# Patient Record
Sex: FEMALE | Race: WHITE | Employment: UNEMPLOYED | URBAN - METROPOLITAN AREA
[De-identification: names, ages, dates, MRNs, and addresses within clinical notes are randomized per-mention and may not be internally consistent; named-entity substitution may affect disease eponyms.]

---

## 2017-01-16 DIAGNOSIS — M54.31 RIGHT SIDED SCIATICA: ICD-10-CM

## 2017-01-17 RX ORDER — METHOCARBAMOL 500 MG/1
500-1000 TABLET, FILM COATED ORAL 4 TIMES DAILY
Qty: 60 TAB | Refills: 0 | Status: SHIPPED | OUTPATIENT
Start: 2017-01-17 | End: 2019-05-02

## 2017-02-17 ENCOUNTER — PATIENT OUTREACH (OUTPATIENT)
Dept: FAMILY MEDICINE CLINIC | Age: 62
End: 2017-02-17

## 2017-02-17 NOTE — PROGRESS NOTES
NN Note    Pt listed on 17 [facility] Discharge Report. As per report information pt was hospitalized @ [facility] 17-2/15/17. Diagnosis: COPD w/Acute Exacerbation. Per EMR \"None\" listed for PCP. Call to pt for PCP update. Pt ID verified with 2 identifiers, name and . NN introduction. Pt reported \"I am no longer your pt. My insurance gave me the name of a doctor up here. Nice lady but she won't prescribe medications I need. Right now I don't have a PCP\". Pt was advised to contact [facility]. Request name for another in-network PCP in her area as/first recommendation not suitable for her. Pt verbalized understanding & thanked this NN for the call. No records to be requested. No further NN intervention @ this time. Mary Ellen Nguyen

## 2017-03-22 RX ORDER — IPRATROPIUM BROMIDE AND ALBUTEROL SULFATE 2.5; .5 MG/3ML; MG/3ML
3 SOLUTION RESPIRATORY (INHALATION)
Qty: 120 NEBULE | Refills: 0 | Status: SHIPPED | OUTPATIENT
Start: 2017-03-22

## 2017-03-22 NOTE — TELEPHONE ENCOUNTER
She is a former [place] patient and is in the middle of changing MD's-doesn't plan on staying at this office

## 2017-07-28 RX ORDER — BUDESONIDE AND FORMOTEROL FUMARATE DIHYDRATE 160; 4.5 UG/1; UG/1
2 AEROSOL RESPIRATORY (INHALATION) 2 TIMES DAILY
Qty: 1 INHALER | Refills: 0 | Status: SHIPPED | OUTPATIENT
Start: 2017-07-28 | End: 2017-08-27 | Stop reason: SDUPTHER

## 2017-08-28 ENCOUNTER — TELEPHONE (OUTPATIENT)
Dept: FAMILY MEDICINE CLINIC | Age: 62
End: 2017-08-28

## 2017-09-06 RX ORDER — BUDESONIDE AND FORMOTEROL FUMARATE DIHYDRATE 160; 4.5 UG/1; UG/1
AEROSOL RESPIRATORY (INHALATION)
Qty: 10.2 INHALER | Refills: 0 | Status: SHIPPED | OUTPATIENT
Start: 2017-09-06 | End: 2018-03-09 | Stop reason: SDUPTHER

## 2018-03-09 RX ORDER — BUDESONIDE AND FORMOTEROL FUMARATE DIHYDRATE 160; 4.5 UG/1; UG/1
AEROSOL RESPIRATORY (INHALATION)
Qty: 10.2 INHALER | Refills: 0 | Status: SHIPPED | OUTPATIENT
Start: 2018-03-09 | End: 2018-04-17 | Stop reason: SDUPTHER

## 2019-05-02 ENCOUNTER — OFFICE VISIT (OUTPATIENT)
Dept: FAMILY MEDICINE CLINIC | Age: 64
End: 2019-05-02

## 2019-05-02 VITALS
BODY MASS INDEX: 34.17 KG/M2 | RESPIRATION RATE: 19 BRPM | OXYGEN SATURATION: 99 % | DIASTOLIC BLOOD PRESSURE: 73 MMHG | HEART RATE: 99 BPM | HEIGHT: 62 IN | SYSTOLIC BLOOD PRESSURE: 139 MMHG

## 2019-05-02 DIAGNOSIS — R07.9 CHEST PAIN, UNSPECIFIED TYPE: Primary | ICD-10-CM

## 2019-05-02 DIAGNOSIS — G47.34 OXYGEN DESATURATION DURING SLEEP: ICD-10-CM

## 2019-05-02 DIAGNOSIS — J41.1 MUCOPURULENT CHRONIC BRONCHITIS (HCC): ICD-10-CM

## 2019-05-02 DIAGNOSIS — R06.02 SHORTNESS OF BREATH: ICD-10-CM

## 2019-05-02 RX ORDER — OXYCODONE AND ACETAMINOPHEN 10; 325 MG/1; MG/1
TABLET ORAL
Refills: 0 | COMMUNITY
Start: 2019-04-20

## 2019-05-02 RX ORDER — BUDESONIDE AND FORMOTEROL FUMARATE DIHYDRATE 160; 4.5 UG/1; UG/1
2 AEROSOL RESPIRATORY (INHALATION) 2 TIMES DAILY
Qty: 1 INHALER | Refills: 0 | Status: SHIPPED | OUTPATIENT
Start: 2019-05-02

## 2019-05-02 RX ORDER — PREDNISONE 10 MG/1
TABLET ORAL
Refills: 0 | COMMUNITY
Start: 2019-04-27 | End: 2019-05-02 | Stop reason: SDUPTHER

## 2019-05-02 RX ORDER — SULFAMETHOXAZOLE AND TRIMETHOPRIM 800; 160 MG/1; MG/1
TABLET ORAL
Refills: 0 | COMMUNITY
Start: 2019-04-23 | End: 2019-05-02

## 2019-05-02 RX ORDER — CEPHALEXIN 500 MG/1
CAPSULE ORAL
Refills: 0 | COMMUNITY
Start: 2019-04-23

## 2019-05-02 RX ORDER — FUROSEMIDE 20 MG/1
TABLET ORAL
Refills: 0 | COMMUNITY
Start: 2019-04-27

## 2019-05-02 RX ORDER — TIOTROPIUM BROMIDE 18 UG/1
CAPSULE ORAL; RESPIRATORY (INHALATION)
COMMUNITY
Start: 2019-02-22

## 2019-05-02 RX ORDER — CYCLOBENZAPRINE HCL 10 MG
TABLET ORAL
Refills: 0 | COMMUNITY
Start: 2019-04-18

## 2019-05-02 RX ORDER — CLINDAMYCIN HYDROCHLORIDE 300 MG/1
CAPSULE ORAL
Refills: 0 | COMMUNITY
Start: 2019-04-19 | End: 2019-05-02 | Stop reason: SINTOL

## 2019-05-02 RX ORDER — METHYLPREDNISOLONE 4 MG/1
TABLET ORAL
Refills: 0 | COMMUNITY
Start: 2019-03-27

## 2019-05-02 NOTE — PROGRESS NOTES
S: Benito Senior is a 59 y.o. female who presents for SOB    Assessment/Plan:  1. Chest pain, unspecified type  - AMB POC EKG  =  WNL    2. COPD - uncontrolled  -current therapy:  spiriva + symbicort bid + albuterol inhaler prn  -pt currently taking pred 10mg bid - has 3 out of 7 days left  -REFERRAL TO PULMONARY DISEASE    RTC 3-4 months for MWV     HPI:   Pt late for appt and had SOB while in waiting room - appeared to be in distress    Has had SOB for years - at least since   Had Primary Care in [facility] - didn't like care she was receiving so she wanted to come back here to [facility] -  Dr. Arturo Mendez was doc she was seeing  Was on home oxygen - 2017 - May 2018 but insurance didn't cover home O2 anymore so didn't continue use. +SOB at night  Currently deteriorating with allergy season  No recent trauma or falls  Asthma/COPD/ HTN or DM - on spiriva and symbicort - taking as prescribed     Use of inhalers - using albuterol 5-6x day   +Cough    +Productive  No Wheezing  Currently on prednisone 10mg bid - has 3 days left out of 7 day course  Also taking abx - keflex   No F/C/N/V  No chest pains. palpitations, back or shoulder pain  Hx of PNA - hospitalized in 19 - cellulitis - given IV keflex - clindamycin made her SOB   Takes lasix prn for LE edema    Sees pain management     Social History:  Nutrition: appetite fine  Social: lives with grandson (31yo) - near [place]  Occupation: not working   Quit smoking - 2017    Social History     Tobacco Use   Smoking Status Current Every Day Smoker    Packs/day: 0.50    Years: 15.00    Pack years: 7.50    Types: Cigarettes    Last attempt to quit: 6/10/2010    Years since quittin.8   Smokeless Tobacco Never Used   Tobacco Comment    pt is seeing clinical psychologist to help with smoking cessation     Social History     Substance and Sexual Activity   Alcohol Use No    Alcohol/week: 0.0 oz     Social History     Substance and Sexual Activity   Drug Use No       No flowsheet data found. Review of Systems:  - Constitutional Symptoms: no fevers, chills, weight loss  - Cardiovascular: no chest pain or palpitations  - Musculoskeletal: no joint pains or weakness  - Integumentary: no ecchymosis  - Neurological: no numbness, tingling, or headaches  ROS is negative otherwise. No flowsheet data found. Past Medical History:   Diagnosis Date    Advanced care planning/counseling discussion 3/24/16    Asthma     [facility] age 37    Pain right side of face    Depressed 8/10/2009    Depression     Dyslipidemia 8/10/2009    Encounter for chronic pain management 09/14/2016    note from [facility]    Fibromyalgia     GERD (gastroesophageal reflux disease)     Herpes labialis     Herpes simplex without mention of complication 5/86/2072    Hypercholesteremia     Obese 8/10/2009    Post-menopausal bleeding     Reflux esophagitis 8/10/2009    Sigmoid diverticulosis 3/9/13    Sliding hiatal hernia 3/2014    right - CT at [address] [facility] Unspecified hypothyroidism 8/10/2009       Current Outpatient Medications   Medication Sig Dispense Refill    cephALEXin (KEFLEX) 500 mg capsule take 1 capsule by mouth four times a day for 10 days  0    cyclobenzaprine (FLEXERIL) 10 mg tablet   0    methylPREDNISolone (MEDROL DOSEPACK) 4 mg tablet as directed Taper dose per instructions inside package  0    furosemide (LASIX) 20 mg tablet   0    oxyCODONE-acetaminophen (PERCOCET 10)  mg per tablet   0    SPIRIVA WITH HANDIHALER 18 mcg inhalation capsule       SYMBICORT 160-4.5 mcg/actuation HFAA inhale 2 puffs by mouth twice a day 1 Inhaler 0    albuterol-ipratropium (DUO-NEB) 2.5 mg-0.5 mg/3 ml nebu 3 mL by Nebulization route every six (6) hours as needed.  120 Nebule 0    albuterol (PROVENTIL HFA, VENTOLIN HFA, PROAIR HFA) 90 mcg/actuation inhaler Take 2 Puffs by inhalation every four (4) hours as needed for Wheezing. 1 Inhaler 2    atorvastatin (LIPITOR) 20 mg tablet Take 1 daily. Dr. Divine Weaver is no longer with the practice. Schedule an Office visit before further refills. 90 Tab 0    gabapentin (NEURONTIN) 300 mg capsule 1 po in AM, 1 in Afternoon, 2 at night as needed for nerve pain. Indications: NEUROPATHIC PAIN 360 Cap 3    NITROSTAT 0.4 mg SL tablet   0    ranitidine (ZANTAC) 150 mg tablet take 1 tablet by mouth twice a day 60 Tab 11    oxymorphone (OPANA ER) 20 mg ER tablet Take 20 mg by mouth every twelve (12) hours. 0    DENAVIR 1 % topical cream Apply  to affected area as needed. 0    guaifenesin SR (MUCINEX) 600 mg SR tablet Take 600 mg by mouth two (2) times a day.  clindamycin (CLEOCIN) 300 mg capsule take 1 capsule by mouth three times a day for 7 days  0    trimethoprim-sulfamethoxazole (BACTRIM DS, SEPTRA DS) 160-800 mg per tablet take 1 tablet by mouth twice a day for 10 days  0    methocarbamol (ROBAXIN) 500 mg tablet Take 1-2 Tabs by mouth four (4) times daily. For muscle spasms. May cause sedation. 60 Tab 0    diazepam (VALIUM) 5 mg tablet take 1/2 to 1 tablet by mouth every 12 hours for anxiety 60 Tab 5    aspirin delayed-release 81 mg tablet Take 81 mg by mouth daily. As per pt ASA 81 mg prescribed by Cardiologist Dr Chris Gasca      oxyCODONE IR (ROXICODONE) 10 mg tab immediate release tablet Take 10 mg by mouth every four (4) hours as needed. 0    promethazine (PHENERGAN) 25 mg tablet Take 1 Tab by mouth every six (6) hours as needed for Nausea. 40 Tab 0       Allergies   Allergen Reactions    Codeine Other (comments)     Chest pain    Nicotine Swelling     Patches caused both feet to swell.  Zithromax [Azithromycin] Nausea Only        O: VS:   Visit Vitals  /73 (BP 1 Location: Left arm, BP Patient Position: Sitting)   Pulse 99   Resp 19   Ht 5' 2\" (1.575 m)   SpO2 99%   BMI 34.17 kg/m²       GENERAL: Lizbeth Vasquez is in no acute distress. Non-toxic. Well nourished. Well developed. Appropriately groomed. HEAD:  Normocephalic. Atraumatic. Non tender sinuses x 4. EYE: PERRLA. EOMs intact. Sclera anicteric without injection. No drainage or discharge. EARS: Hearing intact bilaterally. External ear canals normal without evidence of blood or swelling. Bilateral TM's intact, pearly grey with landmarks visible. No erythema or effusion. Bialt tympanosclerosis noted. NOSE: Patent. Nasal turbinates pink. No erythema. No discharge. MOUTH: mucous membranes pink and moist. Posterior pharynx normal with cobblestone appearance. Erythema, no white exudate or obstruction. NECK: supple. Midline trachea. No carotid bruits noted bilaterally. No thyromegaly noted. No cervical adenopathy noted. RESP: Breath sounds are symmetrical bilaterally. Unlabored without SOB. Speaking in full sentences. Clear to auscultation bilaterally anteriorly and posteriorly. All lung fields with wheezes. No rales or rhonchi. CV: normal rate. Regular rhythm. S1, S2 audible. No murmur noted. No rubs, clicks or gallops noted. MUSC:  Intact x 4 extremities. Full ROM x 4 extremities. No pain with movement. HEME/LYMPH: peripheral pulses palpable 2+ x 4 extremities. No peripheral edema is noted. No cervical adenopathy noted. SKIN: Skin is warm and dry. Turgor is normal. No petechiae, no purpura, no rash. No cyanosis. No mottling, jaundice or pallor. PSYCH: appropriate behavior, dress and thought processes. Good eye contact. Clear and coherent speech.  ___________________________________________________________________  Patient education was done. Advised on nutrition, physical activity, weight management, tobacco, alcohol. Counseling included discussion of diagnosis, differentials, treatment options, prescribed treatment, warning signs and follow up.  Medication risks/benefits, interactions and alternatives discussed with patient.      Patient verbalized understanding and agreed to plan of care.

## 2019-05-02 NOTE — PROGRESS NOTES
Beto Aguilera  Identified pt with two pt identifiers(name and ). Chief Complaint   Patient presents with   [address]     rm 10/non fasting/sob       1. Have you been to the ER, urgent care clinic since your last visit? no Hospitalized since your last visit? no    2. Have you seen or consulted any other health care providers outside of the [address] since your last visit? Include any pap smears or colon screening. no      Advance Care Planning    In the event something were to happen to you and you were unable to speak on your behalf, do you have an Advance Directive/ Living Will in place stating your wishes? NO    If yes, do we have a copy on file NO    If no, would you like information NO    Medication reconciliation up to date and corrected with patient at this time. Today's provider has been notified of reason for visit, vitals and flowsheets obtained on patients. Reviewed record in preparation for visit, huddled with provider and have obtained necessary documentation.       Health Maintenance Due   Topic    Pneumococcal 0-64 years (1 of 1 - PPSV23)    DTaP/Tdap/Td series (1 - Tdap)    PAP AKA CERVICAL CYTOLOGY     Shingrix Vaccine Age 50> (1 of 2)    BREAST CANCER SCRN MAMMOGRAM     FOBT Q 1 YEAR AGE 50-75     MEDICARE YEARLY EXAM        Wt Readings from Last 3 Encounters:   16 186 lb 12.8 oz (84.7 kg)   16 181 lb 6.4 oz (82.3 kg)   12/04/15 180 lb 9.6 oz (81.9 kg)     Temp Readings from Last 3 Encounters:   16 99.5 °F (37.5 °C) (Oral)   16 99.1 °F (37.3 °C) (Oral)   12/04/15 98.6 °F (37 °C) (Oral)     BP Readings from Last 3 Encounters:   19 139/73   16 139/76   16 148/76     Pulse Readings from Last 3 Encounters:   19 99   16 95   16 91     Vitals:    19 1401   BP: 139/73   Pulse: 99   Resp: 19   SpO2: 99%   Height: 5' 2\" (1.575 m)   PainSc:   0 - No pain         Learning Assessment:  :     Learning Assessment 2/7/2014   PRIMARY LEARNER Patient   HIGHEST LEVEL OF EDUCATION - PRIMARY LEARNER  SOME COLLEGE   BARRIERS PRIMARY LEARNER NONE   CO-LEARNER CAREGIVER No   PRIMARY LANGUAGE ENGLISH   LEARNER PREFERENCE PRIMARY LISTENING   ANSWERED BY patient   RELATIONSHIP SELF       Depression Screening:  :     No flowsheet data found. No flowsheet data found. Fall Risk Assessment:  :     No flowsheet data found. Abuse Screening:  :     Abuse Screening Questionnaire 2/7/2014   Do you ever feel afraid of your partner? N   Are you in a relationship with someone who physically or mentally threatens you? N   Is it safe for you to go home? Y       ADL Screening:  :     No flowsheet data found. BMI:  Weight Metrics 5/2/2019 8/4/2016 3/24/2016 12/4/2015 9/17/2015 8/31/2015 5/11/2015   Weight - 186 lb 12.8 oz 181 lb 6.4 oz 180 lb 9.6 oz 175 lb 9.6 oz 174 lb 9.6 oz 176 lb 4.8 oz   BMI 34.17 kg/m2 34.16 kg/m2 33.17 kg/m2 33.02 kg/m2 32.11 kg/m2 31.93 kg/m2 32.24 kg/m2           Medication reconciliation up to date and corrected with patient at this time.

## 2019-05-02 NOTE — PATIENT INSTRUCTIONS
1) EKG = normal  An electrocardiogram (EKG or ECG) is a test that checks for problems with the electrical activity of your heart. An EKG translates the heart's electrical activity into line tracings on paper. The test also can check the health of your heart. For example, it can help find the cause of unexplained chest pain or pressure, or other symptoms of heart disease. An EKG is a completely safe test.  It is a painless, non-invasive test and no electricity passes through your body from the machine, so there is no danger of getting an electrical shock. Referral to pulmonology for further evaluation of shortness of breath/COPD   Refill on symbicort; make sure you are taking all your medications as prescribed         Chronic Obstructive Pulmonary Disease (COPD): Care Instructions  Your Care Instructions    Chronic obstructive pulmonary disease (COPD) is a general term for a group of lung diseases, including emphysema and chronic bronchitis. People with COPD have decreased airflow in and out of the lungs, which makes it hard to breathe. The airways also can get clogged with thick mucus. Cigarette smoking is a major cause of COPD. Although there is no cure for COPD, you can slow its progress. Following your treatment plan and taking care of yourself can help you feel better and live longer. Follow-up care is a key part of your treatment and safety. Be sure to make and go to all appointments, and call your doctor if you are having problems. It's also a good idea to know your test results and keep a list of the medicines you take. How can you care for yourself at home?   Staying healthy    · Do not smoke. This is the most important step you can take to prevent more damage to your lungs. If you need help quitting, talk to your doctor about stop-smoking programs and medicines. These can increase your chances of quitting for good.     · Avoid colds and flu. Get a pneumococcal vaccine shot.  If you have had one before, ask your doctor whether you need a second dose. Get the flu vaccine every fall. If you must be around people with colds or the flu, wash your hands often.     · Avoid secondhand smoke, air pollution, and high altitudes. Also avoid cold, dry air and hot, humid air. Stay at home with your windows closed when air pollution is bad.    Medicines and oxygen therapy    · Take your medicines exactly as prescribed. Call your doctor if you think you are having a problem with your medicine.     · You may be taking medicines such as:  ? Bronchodilators. These help open your airways and make breathing easier. Bronchodilators are either short-acting (work for 6 to 9 hours) or long-acting (work for 24 hours). You inhale most bronchodilators, so they start to act quickly. Always carry your quick-relief inhaler with you in case you need it while you are away from home. ? Corticosteroids (prednisone, budesonide). These reduce airway inflammation. They come in pill or inhaled form. You must take these medicines every day for them to work well.     · A spacer may help you get more inhaled medicine to your lungs. Ask your doctor or pharmacist if a spacer is right for you. If it is, ask how to use it properly.     · Do not take any vitamins, over-the-counter medicine, or herbal products without talking to your doctor first.     · If your doctor prescribed antibiotics, take them as directed. Do not stop taking them just because you feel better. You need to take the full course of antibiotics.     · Oxygen therapy boosts the amount of oxygen in your blood and helps you breathe easier. Use the flow rate your doctor has recommended, and do not change it without talking to your doctor first.   Activity    · Get regular exercise. Walking is an easy way to get exercise. Start out slowly, and walk a little more each day.     · Pay attention to your breathing.  You are exercising too hard if you cannot talk while you are exercising.     · Take short rest breaks when doing household chores and other activities.     · Learn breathing methods--such as breathing through pursed lips--to help you become less short of breath.     · If your doctor has not set you up with a pulmonary rehabilitation program, talk to him or her about whether rehab is right for you. Rehab includes exercise programs, education about your disease and how to manage it, help with diet and other changes, and emotional support. Diet    · Eat regular, healthy meals. Use bronchodilators about 1 hour before you eat to make it easier to eat. Eat several small meals instead of three large ones. Drink beverages at the end of the meal. Avoid foods that are hard to chew.     · Eat foods that contain protein so that you do not lose muscle mass.     · Talk with your doctor if you gain too much weight or if you lose weight without trying.    Mental health    · Talk to your family, friends, or a therapist about your feelings. It is normal to feel frightened, angry, hopeless, helpless, and even guilty. Talking openly about bad feelings can help you cope. If these feelings last, talk to your doctor. When should you call for help? Call 911 anytime you think you may need emergency care. For example, call if:    · You have severe trouble breathing.    Call your doctor now or seek immediate medical care if:    · You have new or worse trouble breathing.     · You cough up blood.     · You have a fever.    Watch closely for changes in your health, and be sure to contact your doctor if:    · You cough more deeply or more often, especially if you notice more mucus or a change in the color of your mucus.     · You have new or worse swelling in your legs or belly.     · You are not getting better as expected. Where can you learn more? Go to http://stephen-oskar.info/.   Pankaj Barry in the search box to learn more about \"Chronic Obstructive Pulmonary Disease (COPD): Care Instructions. \"  Current as of: September 5, 2018  Content Version: 11.9  © 5986-1813 [employer], [facility]. Care instructions adapted under license by [employer] (which disclaims liability or warranty for this information). If you have questions about a medical condition or this instruction, always ask your healthcare professional. [place] [address] any warranty or liability for your use of this information.

## 2019-09-26 ENCOUNTER — TELEPHONE (OUTPATIENT)
Dept: FAMILY MEDICINE CLINIC | Age: 64
End: 2019-09-26

## 2019-09-26 NOTE — TELEPHONE ENCOUNTER
Suze from [facility] called in stating that they received home health orders from [facility] and if MD Rankin would follow and sign off on patient  P: [phone]

## 2019-09-27 NOTE — TELEPHONE ENCOUNTER
Writer called Suze back regarding patient. Dr. Angelo Ruiz will not follow patient until patient is seen for LIZET/Hospital follow up from [facility], [facility]. No answer due to not opening until 0830am. Writer left  requesting phone call back.

## 2019-09-30 ENCOUNTER — TELEPHONE (OUTPATIENT)
Dept: FAMILY MEDICINE CLINIC | Age: 64
End: 2019-09-30

## 2019-09-30 NOTE — TELEPHONE ENCOUNTER
----- Message from Yoana Ventura sent at 9/30/2019 11:02 AM EDT -----  Regarding: Ishmael Leyden - NP-C/ telephone  General Message/Vendor Calls    Caller's first and last name: Caryl Garcia      Reason for call: Pt would like to know if she should still be taking lipator since she has been in the hospital for a mass on her brain and not taking it since 9/18.  Pt was released from [facility] on 9/26      Callback required yes/no and why: Pt would like to know if she should continue taking the liator      Best contact number(s): [phone]    Details to clarify the request: LIZET set for 10/08/19      Yoana Ventura

## 2019-10-01 NOTE — TELEPHONE ENCOUNTER
Writer called patient back with advice from Isela. Writer spoke with patient. Patient verified . Writer notified patient that Isela wants to see her first prior to making the decision, since she has only been seen once by Isela, no labs since , and Dr. Skinny Valentine placed her on the Lipitor. Patient verbalized understanding and appreciation. Writer notified patient of her appointment on 10/08/2019 with Isela and she will discuss then. Patient verbalized understanding and stated that she will be there.

## 2019-10-14 NOTE — TELEPHONE ENCOUNTER
Writer called Suze with [facility] back regarding patient and [place] orders. Writer spoke with Suze. Patient's name and  verified. Writer asked Suze about [place] orders requested. Suze stated that they needed Dr. Jason Thomas to sign of on Plan of Care when faxed over. Writer verbalized understanding, stated that Dr. Jason Thomas would be agreeable once patient has been seen, and she has an appointment today, but Dr. Jason Thomas was out of the country on vacation for 2 weeks. Suze verbalized understanding and appreciation.

## 2019-10-15 NOTE — TELEPHONE ENCOUNTER
Writer called Suze with [facility] back regarding patient's no show to appointment. Writer spoke with Suze. Patient's name and  verified. Writer notified Suze that patient did not show to appointment yesterday. Suze verbalized understanding and appreciation; stated that she will reach out to patient to see what she can do.

## 2020-01-03 ENCOUNTER — TELEPHONE (OUTPATIENT)
Dept: FAMILY MEDICINE CLINIC | Age: 65
End: 2020-01-03

## 2020-01-03 NOTE — TELEPHONE ENCOUNTER
Called an spoke with Pranav Hernandez at [facility] about Orders regarding pt. Informed Pranav Hernandez that we saw the pt 1 time in may of 2019 and have not heard from the pt since and that we did not initate the orders for Home Health. Pranav Hernandez states these orders were place in October and was suppose to be sent to  Dr. Cruz Hanson and to disregard the paperwork that was fax over.

## 2020-01-30 ENCOUNTER — TELEPHONE (OUTPATIENT)
Dept: FAMILY MEDICINE CLINIC | Age: 65
End: 2020-01-30

## 2020-01-30 NOTE — TELEPHONE ENCOUNTER
The patient was seen today by a home health nurse and the patient  has a huge pressure wound on her bottom that is unstagable 12x12 centimeters and full of dead tissue The home health nurse wanted to know if you would be able to give her home health nurse order or refer her to a wound care clinic.  Lane Scott will like a phone call back [phone]

## 2020-02-05 NOTE — TELEPHONE ENCOUNTER
Called, could not leave vm (mailbox full)  in regards to wound care concerns for pt to return call to office.

## 2020-02-13 NOTE — TELEPHONE ENCOUNTER
LPN is routing to provider to receive verbal okay to have pt scheduled for transitional care 02/18/2020 @1000 or same day @12pm